# Patient Record
Sex: FEMALE | Race: WHITE | Employment: UNEMPLOYED | URBAN - METROPOLITAN AREA
[De-identification: names, ages, dates, MRNs, and addresses within clinical notes are randomized per-mention and may not be internally consistent; named-entity substitution may affect disease eponyms.]

---

## 2020-07-01 ENCOUNTER — HOSPITAL ENCOUNTER (EMERGENCY)
Age: 30
Discharge: HOME | End: 2020-07-01
Attending: EMERGENCY MEDICINE
Payer: COMMERCIAL

## 2020-07-01 VITALS
HEART RATE: 93 BPM | OXYGEN SATURATION: 100 % | DIASTOLIC BLOOD PRESSURE: 54 MMHG | HEIGHT: 64 IN | BODY MASS INDEX: 21.34 KG/M2 | TEMPERATURE: 97.7 F | SYSTOLIC BLOOD PRESSURE: 96 MMHG | WEIGHT: 125 LBS | RESPIRATION RATE: 12 BRPM

## 2020-07-01 LAB
BACTERIA: ABNORMAL /HPF
BILIRUBIN URINE: ABNORMAL
BLOOD, URINE: ABNORMAL
CLARITY: ABNORMAL
COLOR: ABNORMAL
EKG ATRIAL RATE: 105 BPM
EKG DIAGNOSIS: NORMAL
EKG P AXIS: 68 DEGREES
EKG P-R INTERVAL: 140 MS
EKG Q-T INTERVAL: 346 MS
EKG QRS DURATION: 86 MS
EKG QTC CALCULATION (BAZETT): 457 MS
EKG R AXIS: 47 DEGREES
EKG T AXIS: 25 DEGREES
EKG VENTRICULAR RATE: 105 BPM
EPITHELIAL CELLS, UA: ABNORMAL /HPF (ref 0–5)
GLUCOSE URINE: NEGATIVE MG/DL
HCG(URINE) PREGNANCY TEST: NEGATIVE
KETONES, URINE: ABNORMAL MG/DL
LEUKOCYTE ESTERASE, URINE: ABNORMAL
MICROSCOPIC EXAMINATION: YES
MUCUS: ABNORMAL /LPF
NITRITE, URINE: POSITIVE
PH UA: 5.5 (ref 5–8)
PROTEIN UA: 100 MG/DL
RBC UA: ABNORMAL /HPF (ref 0–4)
SPECIFIC GRAVITY UA: >=1.03 (ref 1–1.03)
URINE REFLEX TO CULTURE: YES
URINE TYPE: ABNORMAL
UROBILINOGEN, URINE: 4 E.U./DL
WBC UA: ABNORMAL /HPF (ref 0–5)

## 2020-07-01 PROCEDURE — 87086 URINE CULTURE/COLONY COUNT: CPT

## 2020-07-01 PROCEDURE — 87077 CULTURE AEROBIC IDENTIFY: CPT

## 2020-07-01 PROCEDURE — 93010 ELECTROCARDIOGRAM REPORT: CPT | Performed by: INTERNAL MEDICINE

## 2020-07-01 PROCEDURE — 81001 URINALYSIS AUTO W/SCOPE: CPT

## 2020-07-01 PROCEDURE — 93005 ELECTROCARDIOGRAM TRACING: CPT | Performed by: EMERGENCY MEDICINE

## 2020-07-01 PROCEDURE — 87186 SC STD MICRODIL/AGAR DIL: CPT

## 2020-07-01 PROCEDURE — 6370000000 HC RX 637 (ALT 250 FOR IP): Performed by: EMERGENCY MEDICINE

## 2020-07-01 PROCEDURE — 99284 EMERGENCY DEPT VISIT MOD MDM: CPT

## 2020-07-01 PROCEDURE — 84703 CHORIONIC GONADOTROPIN ASSAY: CPT

## 2020-07-01 RX ORDER — ONDANSETRON 4 MG/1
4 TABLET, ORALLY DISINTEGRATING ORAL EVERY 8 HOURS PRN
Qty: 10 TABLET | Refills: 0 | Status: SHIPPED | OUTPATIENT
Start: 2020-07-01 | End: 2021-03-17

## 2020-07-01 RX ORDER — CEFUROXIME AXETIL 250 MG/1
500 TABLET ORAL ONCE
Status: COMPLETED | OUTPATIENT
Start: 2020-07-01 | End: 2020-07-01

## 2020-07-01 RX ORDER — CLONIDINE HYDROCHLORIDE 0.1 MG/1
0.1 TABLET ORAL 3 TIMES DAILY
Qty: 15 TABLET | Refills: 0 | Status: SHIPPED | OUTPATIENT
Start: 2020-07-01 | End: 2021-03-17

## 2020-07-01 RX ORDER — NALOXONE HYDROCHLORIDE 4 MG/.1ML
1 SPRAY NASAL PRN
Qty: 1 EACH | Refills: 0 | Status: SHIPPED | OUTPATIENT
Start: 2020-07-01

## 2020-07-01 RX ORDER — CEFUROXIME AXETIL 250 MG/1
250 TABLET ORAL 2 TIMES DAILY
Qty: 14 TABLET | Refills: 0 | Status: SHIPPED | OUTPATIENT
Start: 2020-07-01 | End: 2020-07-08

## 2020-07-01 RX ADMIN — CEFUROXIME AXETIL 500 MG: 250 TABLET ORAL at 16:39

## 2020-07-01 ASSESSMENT — ENCOUNTER SYMPTOMS
VOMITING: 0
ABDOMINAL PAIN: 0
FACIAL SWELLING: 0
STRIDOR: 0
TROUBLE SWALLOWING: 0
NAUSEA: 0
SHORTNESS OF BREATH: 0
WHEEZING: 0
COLOR CHANGE: 0
VOICE CHANGE: 0

## 2020-07-01 NOTE — ED PROVIDER NOTES
[address] ED  eMERGENCY dEPARTMENT eNCOUnter      Pt Name: Gamal Oseguera  MRN: [id number]  [place] 1990  Date of evaluation: 7/1/2020  Provider: Rosie Rincon MD    CHIEF COMPLAINT       Chief Complaint   Patient presents with    Drug Overdose     Found unresponsive with needles next to her. Admits to heroin. Gave Narcan 2 mg IN by EMS at . Pt sluggish to respond. HISTORY OF PRESENT ILLNESS   (Location/Symptom, Timing/Onset, Context/Setting, Quality, Duration, Modifying Factors, Severity)  Note limiting factors. Gamal Oseguera is a 26 y.o. female with hx of drug abuse who presents via EMS after being found unresponsive by her father. The father reports that he let her stay at his house last night and this morning he found her unresponsive with needles next to her. Patient was given 2 mg of intranasal Narcan with provement in mental status and respiratory effort. She does report that she has been using IV heroin. She denies any SI or HI and reports it was recreational in nature and that the overdose was unintentional.  She denies any fever, nausea, vomiting, diarrhea, or medical complaints at this time. She questions were sudden onset, cough, and have improved with Narcan. HPI    Nursing Notes were reviewed. REVIEW OFSYSTEMS    (2-9 systems for level 4, 10 or more for level 5)     Review of Systems   Constitutional: Negative for appetite change, fever and unexpected weight change. HENT: Negative for facial swelling, trouble swallowing and voice change. Eyes: Negative for visual disturbance. Respiratory: Negative for shortness of breath, wheezing and stridor. Cardiovascular: Negative for chest pain and palpitations. Gastrointestinal: Negative for abdominal pain, nausea and vomiting. Genitourinary: Negative for vaginal bleeding. Musculoskeletal: Negative for neck pain and neck stiffness. Skin: Negative for color change and wound. Phone [phone]   MICROSCOPIC URINALYSIS - Abnormal; Notable for the following components:    Mucus, UA 4+ (*)     WBC, UA  (*)     RBC, UA 5-10 (*)     Bacteria, UA 1+ (*)     All other components within normal limits    Narrative:     Performed at:  [facility] [address],  [place], [facility]   Phone [phone]   CULTURE, URINE   PREGNANCY, URINE    Narrative:     Performed at:  [facility]) [facility] [address],  [place], [facility]   Phone [phone]       All otherlabs were within normal range or not returned as of this dictation. EMERGENCY DEPARTMENT COURSE and DIFFERENTIAL DIAGNOSIS/MDM:   Vitals:    Vitals:    07/01/20 1330 07/01/20 1433   BP: 113/76 (!) 110/59   Pulse: 105 96   Resp: 17 14   Temp: 97.7 °F (36.5 °C)    TempSrc: Oral    SpO2: 95% 100%   Weight: 125 lb (56.7 kg)    Height: 5' 4\" (1.626 m)          MDM   Patient is afebrile and nontoxic nonappearing. Presentation is consistent with an opioid overdose. She does have a GCS of 14 we have considered giving her more Narcan however no further Narcan is necessary during the patient stay in the emergency department and she maintains normal respiratory effort, pulse ox, and her mental status slowly but surely improves as she is observed for over 2 hours in the emergency department. The patient's father is at bedside and states he feels comfortable taking her home. She is prescribed Narcan to go as well as p.o. clonidine and Zofran for symptomatic relief. The patient is referred to local drug rehabilitation resources and strict ER return precautions discussed with the patient and her father. The patient expresses understanding and agreement with this plan. After the patient was marked for discharge and discharge instructions are being reviewed with them the father requests the patient be tested for urinary tract infection.   The patient denied any medical complaints initially upon arriving emergency room but now she states that she has had dysuria for the past 2 days. She denies any vaginal bleeding or vaginal discharge. She denies any concerns for STDs. She reports she does not want to be tested for urinary tract infection now that her father has mentioned that she does have dysuria. Analysis is obtained and does show evidence of acute cystitis with hematuria. Patient is given 1 dose of p.o. Ceftin emergency room and discharged home with 7 additional days. I estimate there is low risk for pyelonephritis, nephrolithiasis, or sepsis, thus I consider the discharge disposition reasonable. Also, there is no evidence for peritonitis, sepsis or toxicity. We have discussed the diagnosis and risks, and we agree with discharging home to follow-up with their primary doctor. We also discussed returning to the Emergency Department immediately if new or worsening symptoms occur. We have discussed the symptoms which are most concerning (e.g., changing or worsening pain, fever, nausea/vomiting, not tolerating PO intake) that necessitate immediate return. Procedures    FINAL IMPRESSION      1. Accidental overdose of heroin, initial encounter ([facility] [address]    2.  Acute cystitis with hematuria          DISPOSITION/PLAN   DISPOSITION Decision To Discharge 07/01/2020 03:31:41 PM      PATIENT REFERRED TO:  See list of drug rehab programs    Today      [address]  [phone]    In 2 days      [facility]  [address] [facility]  [phone]    If symptoms worsen      DISCHARGE MEDICATIONS:  New Prescriptions    CEFUROXIME (CEFTIN) 250 MG TABLET    Take 1 tablet by mouth 2 times daily for 7 days    CLONIDINE (CATAPRES) 0.1 MG TABLET    Take 1 tablet by mouth 3 times daily for 5 days    NALOXONE 4 MG/0.1ML LIQD NASAL SPRAY    1 spray by Nasal route as needed (For accidental Heroin / Opioid overdose) ONDANSETRON (ZOFRAN ODT) 4 MG DISINTEGRATING TABLET    Take 1 tablet by mouth every 8 hours as needed for Nausea          (Please note that portions of this note were completed with a voice recognition program.  Efforts were made to edit the dictations but occasionally words aremis-transcribed. )    Kannan Azevedo MD (electronically signed)  Attending Emergency Physician     Kannan Azevedo MD  07/01/20 2531

## 2020-07-03 LAB
ORGANISM: ABNORMAL
URINE CULTURE, ROUTINE: ABNORMAL
URINE CULTURE, ROUTINE: ABNORMAL

## 2020-09-17 ENCOUNTER — HOSPITAL ENCOUNTER (EMERGENCY)
Age: 30
Discharge: HOME | End: 2020-09-17
Payer: COMMERCIAL

## 2020-09-17 VITALS
TEMPERATURE: 97.4 F | HEART RATE: 81 BPM | OXYGEN SATURATION: 100 % | WEIGHT: 144 LBS | RESPIRATION RATE: 18 BRPM | DIASTOLIC BLOOD PRESSURE: 75 MMHG | SYSTOLIC BLOOD PRESSURE: 117 MMHG | BODY MASS INDEX: 24.59 KG/M2 | HEIGHT: 64 IN

## 2020-09-17 LAB — HCG(URINE) PREGNANCY TEST: NEGATIVE

## 2020-09-17 PROCEDURE — 99281 EMR DPT VST MAYX REQ PHY/QHP: CPT

## 2020-09-17 PROCEDURE — 84703 CHORIONIC GONADOTROPIN ASSAY: CPT

## 2020-09-17 RX ORDER — TRAZODONE HYDROCHLORIDE 50 MG/1
50 TABLET ORAL PRN
COMMUNITY
End: 2021-03-17

## 2020-09-17 RX ORDER — HYDROXYZINE 50 MG/1
50 TABLET, FILM COATED ORAL 3 TIMES DAILY PRN
COMMUNITY
End: 2021-03-17

## 2020-09-17 ASSESSMENT — ENCOUNTER SYMPTOMS
COLOR CHANGE: 0
RHINORRHEA: 0
SORE THROAT: 0
ABDOMINAL PAIN: 0
SHORTNESS OF BREATH: 0

## 2020-09-17 NOTE — ED PROVIDER NOTES
Evaluated by [id number] [facility] Provider          [facility] ED  EMERGENCY DEPARTMENT ENCOUNTER        Pt Name: Nadia Gonzalez  MRN: [id number]  [place] 1990  Dateof evaluation: 9/17/2020  Provider: NIKKIE Rosa - CNP  PCP: Sylvie Angelo MD  ED Attending: No att. providers found    [address] [facility]       Chief Complaint   Patient presents with    Pregnancy Test     pt states she is 7 days late and thinks she is pregnant, needs pregnancy test       HISTORY OF PRESENTILLNESS   (Location/Symptom, Timing/Onset, Context/Setting, Quality, Duration, Modifying Factors, Severity)  Note limiting factors. Nadia Gonzalez is a 31 y.o. female for pregnancy test.  Onset was today. Context includes patient states that she is 7 days late on her period and would like to have a pregnancy test.  Patient denies any abdominal pain nausea or vomiting. She is had no vaginal discharge or bleeding. Alleviating factors include nothing. Aggravating factors include nothing. Pain is 0/10. Nothing has been used for pain today. Nursing Notes were all reviewed and agreed with or any disagreements were addressed  in the HPI. REVIEW OF SYSTEMS    (2-9 systems for level 4, 10 or more for level 5)     Review of Systems   Constitutional: Negative for fever. Pregnancy test   HENT: Negative for congestion, rhinorrhea and sore throat. Respiratory: Negative for shortness of breath. Cardiovascular: Negative for chest pain. Gastrointestinal: Negative for abdominal pain. Genitourinary: Negative for decreased urine volume, difficulty urinating, vaginal bleeding, vaginal discharge and vaginal pain. Musculoskeletal: Negative for arthralgias and myalgias. Skin: Negative for color change and rash. Neurological: Negative for dizziness and light-headedness. Psychiatric/Behavioral: Negative for agitation. All other systems reviewed and are negative.       Positives and Pertinent negatives meetings of clubs or organizations: None     Relationship status: None    Intimate partner violence     Fear of current or ex partner: None     Emotionally abused: None     Physically abused: None     Forced sexual activity: None   Other Topics Concern    None   Social History Narrative    None       SCREENINGS             PHYSICAL EXAM  (up to 7 for level 4, 8 or more for level 5)     ED Triage Vitals [09/17/20 1450]   BP Temp Temp Source Pulse Resp SpO2 Height Weight   117/75 97.4 °F (36.3 °C) Oral 81 18 100 % 5' 4\" (1.626 m) 144 lb (65.3 kg)       Physical Exam  Constitutional:       Appearance: She is well-developed. HENT:      Head: Normocephalic and atraumatic. Neck:      Musculoskeletal: Normal range of motion. Cardiovascular:      Rate and Rhythm: Normal rate. Pulmonary:      Effort: Pulmonary effort is normal. No respiratory distress. Abdominal:      General: There is no distension. Palpations: Abdomen is soft. Tenderness: There is no abdominal tenderness. Musculoskeletal: Normal range of motion. Skin:     General: Skin is warm and dry. Neurological:      Mental Status: She is alert and oriented to person, place, and time. DIAGNOSTIC RESULTS   LABS:    Labs Reviewed   PREGNANCY, URINE    Narrative:     Performed at:  [facility] - [facility] [address],  [place], [facility]   Phone [phone]       All other labs werewithin normal range or not returned as of this dictation. EKG: All EKG's are interpreted by the Emergency Department Physician who either signs or Co-signs this chart in the absence of a cardiologist.  Please see their note for interpretation of EKG. RADIOLOGY:           Interpretation per the Radiologist below, if available at the time of this note:    No orders to display     No results found.       PROCEDURES   Unless otherwise noted below, none     Procedures     CRITICAL CARE TIME

## 2020-09-17 NOTE — ED NOTES
RN went to discharge patient and patient was not in room or restroom, RN attempted to locate patient twice with no success.       Justen Lawrence RN  09/17/20 1140

## 2020-10-03 ENCOUNTER — HOSPITAL ENCOUNTER (EMERGENCY)
Age: 30
Discharge: LEFT BEFORE DISCHARGE | End: 2020-10-03
Payer: COMMERCIAL

## 2020-10-03 VITALS — TEMPERATURE: 97.3 F | HEART RATE: 67 BPM | OXYGEN SATURATION: 99 %

## 2020-10-03 PROCEDURE — 99284 EMERGENCY DEPT VISIT MOD MDM: CPT

## 2020-10-03 NOTE — ED NOTES
To lobby to get pt to take her to room for treatment, pt states she is leaving and does not want to be seen anymore.  Tx team made aware     Lima Vazquez RN  10/03/20 2370

## 2020-10-04 NOTE — ED PROVIDER NOTES
needed for ItchingHistorical Med      naloxone 4 MG/0.1ML LIQD nasal spray 1 spray by Nasal route as needed (For accidental Heroin / Opioid overdose), Disp-1 each, R-0Print      ondansetron (ZOFRAN ODT) 4 MG disintegrating tablet Take 1 tablet by mouth every 8 hours as needed for Nausea, Disp-10 tablet, R-0Print      cloNIDine (CATAPRES) 0.1 MG tablet Take 1 tablet by mouth 3 times daily for 5 days, Disp-15 tablet, R-0Print               ALLERGIES     Patient has no known allergies. FAMILYHISTORY     History reviewed. No pertinent family history. SOCIAL HISTORY       Social History     Tobacco Use    Smoking status: Current Every Day Smoker     Packs/day: 0.50    Smokeless tobacco: Never Used   Substance Use Topics    Alcohol use: Yes     Comment: SOCIAL    Drug use: Yes     Comment: heroin       SCREENINGS             PHYSICAL EXAM    (up to 7 for level 4, 8 or more for level 5)     ED Triage Vitals [10/03/20 1708]   BP Temp Temp Source Pulse Resp SpO2 Height Weight   -- 97.3 °F (36.3 °C) Tympanic 67 -- 99 % -- --       Physical Exam  Vitals signs reviewed. Constitutional:       Appearance: She is not diaphoretic. HENT:      Nose: No congestion or rhinorrhea. Eyes:      General: No scleral icterus. Conjunctiva/sclera: Conjunctivae normal.   Neck:      Musculoskeletal: Normal range of motion and neck supple. Pulmonary:      Effort: Pulmonary effort is normal. No respiratory distress. Breath sounds: No stridor. Abdominal:      General: There is no distension. Palpations: Abdomen is soft. Tenderness: There is no abdominal tenderness. There is no right CVA tenderness, left CVA tenderness, guarding or rebound. Musculoskeletal: Normal range of motion. Skin:     General: Skin is warm and dry. Neurological:      General: No focal deficit present. Mental Status: She is alert and oriented to person, place, and time. Sensory: No sensory deficit.       Motor: No weakness. Psychiatric:         Mood and Affect: Mood normal.         Behavior: Behavior normal.         DIAGNOSTIC RESULTS   LABS:    Labs Reviewed   C.TRACHOMATIS N.GONORRHOEAE DNA   WET PREP, GENITAL   PREGNANCY, URINE       All other labs were within normal range or not returned as of this dictation. EKG: All EKG's are interpreted by the Emergency Department Physician in the absence of a cardiologist.  Please see their note for interpretation of EKG. RADIOLOGY:   Non-plain film images such as CT, Ultrasound and MRI are read by the radiologist. Plain radiographic images are visualized and preliminarily interpreted by the ED Provider with the below findings:        Interpretation per the Radiologist below, if available at the time of this note:    No orders to display     No results found. PROCEDURES   Unless otherwise noted below, none     Procedures    CRITICAL CARE TIME   N/A    CONSULTS:  None      EMERGENCY DEPARTMENT COURSE and DIFFERENTIAL DIAGNOSIS/MDM:   Vitals:    Vitals:    10/03/20 1708   Pulse: 67   Temp: 97.3 °F (36.3 °C)   TempSrc: Tympanic   SpO2: 99%       Patient was given the following medications:  Medications - No data to display        79-year-old female presents emergency room with concern for potential exposure to STD. She does not have any symptoms. Patient was agreeable to self swabs, pregnancy test.  Plan was to discharge after these tests. I did discuss with patient that I recommended follow-up with STD clinic or primary care physician for full STD testing including HIV and syphilis. I was informed by nurse that patient left before giving any samples, patient eloped without completion of care. FINAL IMPRESSION      1. Possible exposure to STD          DISPOSITION/PLAN   DISPOSITION Eloped - Left Before Treatment Complete 10/03/2020 10:43:18 PM      PATIENT REFERREDTO:  No follow-up provider specified.     DISCHARGE MEDICATIONS:  Discharge Medication List as of 10/3/2020  6:52 PM          DISCONTINUED MEDICATIONS:  Discharge Medication List as of 10/3/2020  6:52 PM                 (Please note that portions of this note were completed with a voice recognition program.  Efforts were made to edit the dictations but occasionally words are mis-transcribed.)    DEON Menjivar (electronically signed)         DEON Menjivar  10/03/20 5068

## 2020-10-05 ENCOUNTER — HOSPITAL ENCOUNTER (EMERGENCY)
Age: 30
Discharge: HOME | End: 2020-10-05
Payer: COMMERCIAL

## 2020-10-05 VITALS
DIASTOLIC BLOOD PRESSURE: 75 MMHG | HEIGHT: 64 IN | TEMPERATURE: 98.2 F | OXYGEN SATURATION: 98 % | SYSTOLIC BLOOD PRESSURE: 109 MMHG | RESPIRATION RATE: 16 BRPM | WEIGHT: 138 LBS | HEART RATE: 98 BPM | BODY MASS INDEX: 23.56 KG/M2

## 2020-10-05 LAB
BACTERIA WET PREP: NORMAL
BILIRUBIN URINE: NEGATIVE
BLOOD, URINE: NEGATIVE
CLARITY: CLEAR
CLUE CELLS: NORMAL
COLOR: YELLOW
EPITHELIAL CELLS WET PREP: NORMAL
GLUCOSE URINE: NEGATIVE MG/DL
HCG(URINE) PREGNANCY TEST: NEGATIVE
KETONES, URINE: NEGATIVE MG/DL
LEUKOCYTE ESTERASE, URINE: NEGATIVE
MICROSCOPIC EXAMINATION: NORMAL
NITRITE, URINE: NEGATIVE
PH UA: 6 (ref 5–8)
PROTEIN UA: NEGATIVE MG/DL
RBC WET PREP: NORMAL
SOURCE WET PREP: NORMAL
SPECIFIC GRAVITY UA: 1.02 (ref 1–1.03)
TRICHOMONAS PREP: NORMAL
URINE REFLEX TO CULTURE: NORMAL
URINE TYPE: NORMAL
UROBILINOGEN, URINE: 0.2 E.U./DL
WBC WET PREP: NORMAL
YEAST WET PREP: NORMAL

## 2020-10-05 PROCEDURE — 6370000000 HC RX 637 (ALT 250 FOR IP): Performed by: PHYSICIAN ASSISTANT

## 2020-10-05 PROCEDURE — 87491 CHLMYD TRACH DNA AMP PROBE: CPT

## 2020-10-05 PROCEDURE — 84703 CHORIONIC GONADOTROPIN ASSAY: CPT

## 2020-10-05 PROCEDURE — 96372 THER/PROPH/DIAG INJ SC/IM: CPT

## 2020-10-05 PROCEDURE — 81003 URINALYSIS AUTO W/O SCOPE: CPT

## 2020-10-05 PROCEDURE — 6360000002 HC RX W HCPCS: Performed by: PHYSICIAN ASSISTANT

## 2020-10-05 PROCEDURE — 99283 EMERGENCY DEPT VISIT LOW MDM: CPT

## 2020-10-05 PROCEDURE — 87591 N.GONORRHOEAE DNA AMP PROB: CPT

## 2020-10-05 PROCEDURE — 87210 SMEAR WET MOUNT SALINE/INK: CPT

## 2020-10-05 RX ORDER — FLUCONAZOLE 100 MG/1
100 TABLET ORAL ONCE
Status: COMPLETED | OUTPATIENT
Start: 2020-10-05 | End: 2020-10-05

## 2020-10-05 RX ORDER — CEFTRIAXONE SODIUM 250 MG/1
250 INJECTION, POWDER, FOR SOLUTION INTRAMUSCULAR; INTRAVENOUS ONCE
Status: COMPLETED | OUTPATIENT
Start: 2020-10-05 | End: 2020-10-05

## 2020-10-05 RX ORDER — METRONIDAZOLE 250 MG/1
2000 TABLET ORAL ONCE
Status: COMPLETED | OUTPATIENT
Start: 2020-10-05 | End: 2020-10-05

## 2020-10-05 RX ORDER — AZITHROMYCIN 250 MG/1
1000 TABLET, FILM COATED ORAL ONCE
Status: COMPLETED | OUTPATIENT
Start: 2020-10-05 | End: 2020-10-05

## 2020-10-05 RX ORDER — FLUCONAZOLE 150 MG/1
150 TABLET ORAL ONCE
Qty: 1 TABLET | Refills: 0 | Status: SHIPPED | OUTPATIENT
Start: 2020-10-05 | End: 2020-10-05

## 2020-10-05 RX ADMIN — CEFTRIAXONE SODIUM 250 MG: 250 INJECTION, POWDER, FOR SOLUTION INTRAMUSCULAR; INTRAVENOUS at 14:35

## 2020-10-05 RX ADMIN — AZITHROMYCIN 1000 MG: 250 TABLET, FILM COATED ORAL at 14:35

## 2020-10-05 RX ADMIN — FLUCONAZOLE 100 MG: 100 TABLET ORAL at 14:35

## 2020-10-05 RX ADMIN — METRONIDAZOLE 2000 MG: 250 TABLET ORAL at 14:35

## 2020-10-05 ASSESSMENT — ENCOUNTER SYMPTOMS
CHEST TIGHTNESS: 0
CONSTIPATION: 0
SINUS PRESSURE: 0
EYE DISCHARGE: 0
SHORTNESS OF BREATH: 0
RHINORRHEA: 0
NAUSEA: 0
ABDOMINAL PAIN: 0
VOMITING: 0
SORE THROAT: 0
COUGH: 0
SINUS PAIN: 0
DIARRHEA: 0
EYE REDNESS: 0

## 2020-10-05 NOTE — ED TRIAGE NOTES
Chief Complaint   Patient presents with    Exposure to STD     pt states she has been having white discharge, denies dysuria, wants to be checked for STD

## 2020-10-05 NOTE — ED PROVIDER NOTES
Evaluated by Advanced Practice Provider          [facility]  EMERGENCY DEPARTMENT ENCOUNTER      This patient was not seen and evaluated by the attending physician No att. providers found. I have independently evaluated this patient. Pt Name: Shoaib Scott  MRN: [id number]  [place] 1990  Dateof evaluation: 10/5/2020  Provider: Selam Sanchez PA-C  PCP: Daly Gomez MD  CHIEF COMPLAINT       Chief Complaint   Patient presents with    Exposure to STD     pt states she has been having white discharge, denies dysuria, wants to be checked for STD       HISTORY OF PRESENTILLNESS   (Location/Symptom, Timing/Onset, Context/Setting, Quality, Duration, Modifying Factors, Severity)  Note limiting factors. Shoaib Scott is a 31 y.o. female for evaluation via private vehicle patient thinks that she was exposed to chlamydia said that her partner tested positive for this she says she has had some white vaginal discharge. Denies any dyspareunia or dysuria any abnormal vaginal bleeding. Patient advised that she is not pregnant today no treatment prior to arrival.Nursing Notes were all reviewed and agreed with or any disagreements were addressed  in the HPI. REVIEW OF SYSTEMS    (2-9 systems for level 4, 10 or more for level 5)     Review of Systems   Constitutional: Negative for chills and fever. HENT: Negative. Negative for congestion, rhinorrhea, sinus pressure, sinus pain and sore throat. Eyes: Negative for discharge, redness and visual disturbance. Respiratory: Negative for cough, chest tightness and shortness of breath. Cardiovascular: Negative for chest pain and palpitations. Gastrointestinal: Negative for abdominal pain, constipation, diarrhea, nausea and vomiting. Genitourinary: Positive for vaginal discharge. Negative for difficulty urinating, dysuria and frequency. Musculoskeletal: Negative. Skin: Negative. Neurological: Negative.   Negative for dizziness, weakness, numbness and headaches. Psychiatric/Behavioral: Negative. All other systems reviewed and are negative. Positives and Pertinent negatives as per HPI. Except as noted above in the ROS, all other systems were reviewed and negative. 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 COLI-URINE         SURGICAL HISTORY     History reviewed. No pertinent surgical history. CURRENT MEDICATIONS       Discharge Medication List as of 10/5/2020  3:28 PM      CONTINUE these medications which have NOT CHANGED    Details   traZODone (DESYREL) 50 MG tablet Take 50 mg by mouth as needed for SleepHistorical Med      hydrOXYzine (ATARAX) 50 MG tablet Take 50 mg by mouth 3 times daily as needed for ItchingHistorical Med      naloxone 4 MG/0.1ML LIQD nasal spray 1 spray by Nasal route as needed (For accidental Heroin / Opioid overdose), Disp-1 each, R-0Print      ondansetron (ZOFRAN ODT) 4 MG disintegrating tablet Take 1 tablet by mouth every 8 hours as needed for Nausea, Disp-10 tablet, R-0Print      cloNIDine (CATAPRES) 0.1 MG tablet Take 1 tablet by mouth 3 times daily for 5 days, Disp-15 tablet, R-0Print               ALLERGIES     Patient has no known allergies. FAMILY HISTORY     History reviewed. No pertinent family history.        SOCIAL HISTORY       Social History     Socioeconomic History    Marital status: Single     Spouse name: None    Number of children: None    Years of education: None    Highest education level: None   Occupational History    None   Social Needs    Financial resource strain: None    Food insecurity     Worry: None     Inability: None    Transportation needs     Medical: None     Non-medical: None   Tobacco Use    Smoking status: Current Every Day Smoker     Packs/day: 0.50    Smokeless tobacco: Never Used   Substance and Sexual Activity    Alcohol use: Not Currently     Comment: SOCIAL    Drug use: Not Currently     Comment: heroin    Sexual activity: None   Lifestyle    Physical activity     Days per week: None     Minutes per session: None    Stress: None   Relationships    Social connections     Talks on phone: None     Gets together: None     Attends [employer] service: None     Active member of club or organization: None     Attends meetings of clubs or organizations: None     Relationship status: None    Intimate partner violence     Fear of current or ex partner: None     Emotionally abused: None     Physically abused: None     Forced sexual activity: None   Other Topics Concern    None   Social History Narrative    None       SCREENINGS     NIH Score       [facility]     Heart Score         PHYSICAL EXAM  (up to 7 for level 4, 8 or more for level 5)     ED Triage Vitals [10/05/20 1401]   BP Temp Temp Source Pulse Resp SpO2 Height Weight   109/75 98.2 °F (36.8 °C) Oral 98 16 98 % 5' 4\" (1.626 m) 138 lb (62.6 kg)       Physical Exam  Vitals signs and nursing note reviewed. Constitutional:       Appearance: She is well-developed. She is not diaphoretic. HENT:      Head: Normocephalic. Nose: Nose normal.      Mouth/Throat:      Pharynx: No oropharyngeal exudate. Eyes:      General:         Right eye: No discharge. Left eye: No discharge. Conjunctiva/sclera: Conjunctivae normal.      Pupils: Pupils are equal, round, and reactive to light. Neck:      Musculoskeletal: Normal range of motion. Cardiovascular:      Rate and Rhythm: Normal rate and regular rhythm. Heart sounds: Normal heart sounds. No murmur. No friction rub. No gallop. Pulmonary:      Effort: Pulmonary effort is normal. No respiratory distress. Breath sounds: Normal breath sounds. No wheezing. Abdominal:      General: Bowel sounds are normal. There is no distension. Palpations: Abdomen is soft. Tenderness:  There is no abdominal tenderness. Musculoskeletal: Normal range of motion. Skin:     General: Skin is warm and dry. Capillary Refill: Capillary refill takes less than 2 seconds. Neurological:      General: No focal deficit present. Mental Status: She is alert. Psychiatric:         Mood and Affect: Mood normal.         Behavior: Behavior normal.         DIAGNOSTIC RESULTS   LABS:    Labs Reviewed   WET PREP, GENITAL    Narrative:     Performed at:  [facility] [address],  [place], [facility]   Phone [phone]   C.TRACHOMATIS N.GONORRHOEAE DNA, URINE   URINE RT REFLEX TO CULTURE    Narrative:     Performed at:  [facility] [address],  [place], [facility]   Phone [phone]   PREGNANCY, URINE    Narrative:     Performed at:  [facility] [address],  [place], [facility]   Phone [phone]       All other labs werewithin normal range or not returned as of this dictation. CONSULTS:  None      EMERGENCYDEPARTMENT COURSE and DIFFERENTIAL DIAGNOSIS/MDM:   Vitals:    Vitals:    10/05/20 1401   BP: 109/75   Pulse: 98   Resp: 16   Temp: 98.2 °F (36.8 °C)   TempSrc: Oral   SpO2: 98%   Weight: 138 lb (62.6 kg)   Height: 5' 4\" (1.626 m)       Patient was given the following medications:  Medications   cefTRIAXone (ROCEPHIN) injection 250 mg (250 mg Intramuscular Given 10/5/20 1435)   azithromycin (ZITHROMAX) tablet 1,000 mg (1,000 mg Oral Given 10/5/20 1435)   metroNIDAZOLE (FLAGYL) tablet 2,000 mg (2,000 mg Oral Given 10/5/20 1435)   fluconazole (DIFLUCAN) tablet 100 mg (100 mg Oral Given 10/5/20 1435)           Afebrile, stable, patient presents to the ED for evaluation. Seen on my own, per my scope of practice, with attending ED provider available for consultation who agrees with assessment and plan.   Nontoxic patient no abdominal or pelvic tenderness, normal vital signs in no acute distress OBSTRUCTION, CHOLECYSTITIS, DIVERTICULITIS, INCARCERATED HERNIA, PANCREATITIS, PELVIC INFLAMMATORY DISEASE, PERFORATED BOWEL or ULCER, PREGNANCY, or TUBO-OVARIAN ABSCESS, thus I consider the discharge disposition reasonable. Also, there is no evidence or peritonitis, sepsis, or toxicity. Annika Gathers and I have discussed the diagnosis and risks, and we agree with discharging home to follow-up with their primary doctor. We also discussed returning to the Emergency Department immediately if new or worsening symptoms occur. We have discussed the symptoms which are most concerning (e.g., bloody stool, fever, changing or worsening pain, vomiting) that necessitate immediate return. Final Impression    1. Exposure to STD        Blood pressure 109/75, pulse 98, temperature 98.2 °F (36.8 °C), temperature source Oral, resp. rate 16, height 5' 4\" (1.626 m), weight 138 lb (62.6 kg), last menstrual period 09/19/2020, SpO2 98 %.   10/05/2020 03:16:27 PM      PATIENT REFERRED TO:  Justo Sparks MD  [address] [facility]  [phone]            DISCHARGE MEDICATIONS:  Discharge Medication List as of 10/5/2020  3:28 PM      START taking these medications    Details   fluconazole (DIFLUCAN) 150 MG tablet Take 1 tablet by mouth once for 1 dose, Disp-1 tablet,R-0Print             DISCONTINUED MEDICATIONS:  Discharge Medication List as of 10/5/2020  3:28 PM                 (Please note that portions of this note were completed with a voice recognition program.  Efforts were made to edit the dictations but occasionally words are mis-transcribed.)    Phillip Cobb PA-C (electronically signed)         Phillip Cobb PA-C  10/05/20 1013

## 2020-10-05 NOTE — ED NOTES
Called pt. Unable to locate pt in lobby. Per registration pt stated she was going to cafeteria.      Irina Malloy RN  10/05/20 4238

## 2020-10-06 LAB
C. TRACHOMATIS DNA ,URINE: NEGATIVE
N. GONORRHOEAE DNA, URINE: NEGATIVE

## 2021-03-17 ENCOUNTER — HOSPITAL ENCOUNTER (EMERGENCY)
Age: 31
Discharge: HOME | End: 2021-03-17
Attending: EMERGENCY MEDICINE
Payer: COMMERCIAL

## 2021-03-17 VITALS
HEIGHT: 64 IN | WEIGHT: 156 LBS | RESPIRATION RATE: 18 BRPM | HEART RATE: 99 BPM | SYSTOLIC BLOOD PRESSURE: 112 MMHG | TEMPERATURE: 97.8 F | BODY MASS INDEX: 26.63 KG/M2 | OXYGEN SATURATION: 99 % | DIASTOLIC BLOOD PRESSURE: 63 MMHG

## 2021-03-17 DIAGNOSIS — T40.1X1A ACCIDENTAL OVERDOSE OF HEROIN, INITIAL ENCOUNTER (HCC): Primary | ICD-10-CM

## 2021-03-17 DIAGNOSIS — Z34.92 SECOND TRIMESTER PREGNANCY: ICD-10-CM

## 2021-03-17 LAB
A/G RATIO: 1.1 (ref 1.1–2.2)
ACETAMINOPHEN LEVEL: <5 UG/ML (ref 10–30)
ALBUMIN SERPL-MCNC: 4.1 G/DL (ref 3.4–5)
ALP BLD-CCNC: 68 U/L (ref 40–129)
ALT SERPL-CCNC: 44 U/L (ref 10–40)
AMPHETAMINE SCREEN, URINE: POSITIVE
ANION GAP SERPL CALCULATED.3IONS-SCNC: 11 MMOL/L (ref 3–16)
AST SERPL-CCNC: 37 U/L (ref 15–37)
BARBITURATE SCREEN URINE: ABNORMAL
BASOPHILS ABSOLUTE: 0 K/UL (ref 0–0.2)
BASOPHILS RELATIVE PERCENT: 0.5 %
BENZODIAZEPINE SCREEN, URINE: ABNORMAL
BILIRUB SERPL-MCNC: <0.2 MG/DL (ref 0–1)
BILIRUBIN URINE: NEGATIVE
BLOOD, URINE: NEGATIVE
BUN BLDV-MCNC: 8 MG/DL (ref 7–20)
CALCIUM SERPL-MCNC: 9.3 MG/DL (ref 8.3–10.6)
CANNABINOID SCREEN URINE: ABNORMAL
CHLORIDE BLD-SCNC: 99 MMOL/L (ref 99–110)
CLARITY: CLEAR
CO2: 27 MMOL/L (ref 21–32)
COCAINE METABOLITE SCREEN URINE: ABNORMAL
COLOR: YELLOW
CREAT SERPL-MCNC: <0.5 MG/DL (ref 0.6–1.1)
EOSINOPHILS ABSOLUTE: 0.1 K/UL (ref 0–0.6)
EOSINOPHILS RELATIVE PERCENT: 1.3 %
ETHANOL: NORMAL MG/DL (ref 0–0.08)
GFR AFRICAN AMERICAN: >60
GFR NON-AFRICAN AMERICAN: >60
GLOBULIN: 3.6 G/DL
GLUCOSE BLD-MCNC: 90 MG/DL (ref 70–99)
GLUCOSE URINE: NEGATIVE MG/DL
GONADOTROPIN, CHORIONIC (HCG) QUANT: NORMAL MIU/ML
HCT VFR BLD CALC: 38.9 % (ref 36–48)
HEMOGLOBIN: 13.5 G/DL (ref 12–16)
KETONES, URINE: NEGATIVE MG/DL
LEUKOCYTE ESTERASE, URINE: NEGATIVE
LYMPHOCYTES ABSOLUTE: 4.4 K/UL (ref 1–5.1)
LYMPHOCYTES RELATIVE PERCENT: 43.6 %
Lab: ABNORMAL
MCH RBC QN AUTO: 30.8 PG (ref 26–34)
MCHC RBC AUTO-ENTMCNC: 34.6 G/DL (ref 31–36)
MCV RBC AUTO: 89 FL (ref 80–100)
METHADONE SCREEN, URINE: ABNORMAL
MICROSCOPIC EXAMINATION: NORMAL
MONOCYTES ABSOLUTE: 0.5 K/UL (ref 0–1.3)
MONOCYTES RELATIVE PERCENT: 4.9 %
NEUTROPHILS ABSOLUTE: 5 K/UL (ref 1.7–7.7)
NEUTROPHILS RELATIVE PERCENT: 49.7 %
NITRITE, URINE: NEGATIVE
OPIATE SCREEN URINE: ABNORMAL
OXYCODONE URINE: ABNORMAL
PDW BLD-RTO: 14.5 % (ref 12.4–15.4)
PH UA: 5.5
PH UA: 5.5 (ref 5–8)
PHENCYCLIDINE SCREEN URINE: ABNORMAL
PLATELET # BLD: 281 K/UL (ref 135–450)
PMV BLD AUTO: 8.4 FL (ref 5–10.5)
POTASSIUM REFLEX MAGNESIUM: 3.6 MMOL/L (ref 3.5–5.1)
PROPOXYPHENE SCREEN: ABNORMAL
PROTEIN UA: NEGATIVE MG/DL
RBC # BLD: 4.37 M/UL (ref 4–5.2)
SALICYLATE, SERUM: <0.3 MG/DL (ref 15–30)
SODIUM BLD-SCNC: 137 MMOL/L (ref 136–145)
SPECIFIC GRAVITY UA: 1.02 (ref 1–1.03)
TOTAL PROTEIN: 7.7 G/DL (ref 6.4–8.2)
URINE REFLEX TO CULTURE: NORMAL
URINE TYPE: NORMAL
UROBILINOGEN, URINE: 0.2 E.U./DL
WBC # BLD: 10.2 K/UL (ref 4–11)

## 2021-03-17 PROCEDURE — 80307 DRUG TEST PRSMV CHEM ANLYZR: CPT

## 2021-03-17 PROCEDURE — 99284 EMERGENCY DEPT VISIT MOD MDM: CPT

## 2021-03-17 PROCEDURE — 82077 ASSAY SPEC XCP UR&BREATH IA: CPT

## 2021-03-17 PROCEDURE — 80143 DRUG ASSAY ACETAMINOPHEN: CPT

## 2021-03-17 PROCEDURE — 80179 DRUG ASSAY SALICYLATE: CPT

## 2021-03-17 PROCEDURE — 85025 COMPLETE CBC W/AUTO DIFF WBC: CPT

## 2021-03-17 PROCEDURE — 80053 COMPREHEN METABOLIC PANEL: CPT

## 2021-03-17 PROCEDURE — 84702 CHORIONIC GONADOTROPIN TEST: CPT

## 2021-03-17 PROCEDURE — 81003 URINALYSIS AUTO W/O SCOPE: CPT

## 2021-03-17 PROCEDURE — 2580000003 HC RX 258: Performed by: PHYSICIAN ASSISTANT

## 2021-03-17 RX ORDER — ONDANSETRON 2 MG/ML
4 INJECTION INTRAMUSCULAR; INTRAVENOUS EVERY 6 HOURS PRN
Status: DISCONTINUED | OUTPATIENT
Start: 2021-03-17 | End: 2021-03-17 | Stop reason: HOSPADM

## 2021-03-17 RX ORDER — 0.9 % SODIUM CHLORIDE 0.9 %
1000 INTRAVENOUS SOLUTION INTRAVENOUS ONCE
Status: COMPLETED | OUTPATIENT
Start: 2021-03-17 | End: 2021-03-17

## 2021-03-17 RX ORDER — NALOXONE HYDROCHLORIDE 4 MG/.1ML
1 SPRAY NASAL PRN
Qty: 1 EACH | Refills: 0 | Status: SHIPPED | OUTPATIENT
Start: 2021-03-17

## 2021-03-17 RX ADMIN — SODIUM CHLORIDE 1000 ML: 9 INJECTION, SOLUTION INTRAVENOUS at 12:12

## 2021-03-17 ASSESSMENT — ENCOUNTER SYMPTOMS
RESPIRATORY NEGATIVE: 1
NAUSEA: 1

## 2021-03-17 NOTE — ED PROVIDER NOTES
[address] [facility]  EMERGENCY DEPARTMENT ENCOUNTER        Pt Name: Melany Yoon  MRN: [id number]  [place] 1990  Date of evaluation: 3/17/2021  Provider: Karen Salvador PA-C  PCP: No primary care provider on file. I have seen and evaluated this patient with my supervising physician Chad Townsend MD.    [facility]       Chief Complaint   Patient presents with    Drug Overdose     heroin overdose, given 4mg narcan prior to EMS arrival, c/o nausea given zofran by EMS, patient is 18 weeks pregnant       HISTORY OF PRESENT ILLNESS   (Location, Timing/Onset, Context/Setting, Quality, Duration, Modifying Factors, Severity, Associated Signs and Symptoms)  Note limiting factors. Melany Yoon is a 31 y.o. female  A2 with a past medical history of tobacco use brought in today by squad for evaluation of a heroin overdose. Per EMS she did receive 4 mg of Narcan prior to arrival from her boyfriend who states that she was unresponsive at the time prior to receiving narcan. He also received Zofran for nausea. Patient is 18 weeks pregnant. States that she used heroin at about 11 AM.  She states that she has not used in about 6 months. Denies any alcohol use. Patient does smoke tobacco.  Onset occurred at about 11 AM.  Duration symptoms have been persistent since onset. Context includes heroin overdose. Patient reports that this is her fourth pregnancy. She has followed with [facility]. Denies any complications with pregnancy at this time. States she has had regular visits. She denies any chest pain or shortness of breath. Denies abdominal pain. Denies vaginal bleeding. She states she was nauseous however was given Zofran and reports improvement of symptoms. No aggravating complaints. No alleviating complaints. Otherwise denies any other complaints at this time. Nothing seems to make symptoms better or worse.     Nursing Notes were all reviewed and agreed with or any disagreements were addressed in the HPI. REVIEW OF SYSTEMS    (2-9 systems for level 4, 10 or more for level 5)     Review of Systems   Constitutional: Negative. Respiratory: Negative. Cardiovascular: Negative. Gastrointestinal: Positive for nausea. Genitourinary: Negative. Musculoskeletal: Negative. Skin: Negative. Neurological: Negative. Positives and Pertinent negatives as per HPI. Except as noted above in the ROS, all other systems were reviewed and negative. 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 COLI-URINE         SURGICAL HISTORY   No past surgical history on file. CURRENTMEDICATIONS       Previous Medications    NALOXONE 4 MG/0.1ML LIQD NASAL SPRAY    1 spray by Nasal route as needed (For accidental Heroin / Opioid overdose)         ALLERGIES     Patient has no known allergies. FAMILYHISTORY     No family history on file. SOCIAL HISTORY       Social History     Tobacco Use    Smoking status: Current Every Day Smoker     Packs/day: 0.50    Smokeless tobacco: Never Used   Substance Use Topics    Alcohol use: Not Currently     Comment: SOCIAL    Drug use: Not Currently     Types: IV     Comment: heroin       SCREENINGS             PHYSICAL EXAM    (up to 7 for level 4, 8 or more for level 5)     ED Triage Vitals [03/17/21 1122]   BP Temp Temp Source Pulse Resp SpO2 Height Weight   122/86 97.8 °F (36.6 °C) Oral 91 18 100 % 5' 4\" (1.626 m) 156 lb (70.8 kg)       Physical Exam  Vitals signs and nursing note reviewed. Constitutional:       General: She is awake. She is not in acute distress. Appearance: Normal appearance. She is well-developed. She is not ill-appearing, toxic-appearing or diaphoretic. HENT:      Head: Normocephalic and atraumatic. Nose: Nose normal.   Eyes:      General:         Right eye: No discharge.          Left eye: No discharge. Neck:      Musculoskeletal: Normal range of motion and neck supple. Cardiovascular:      Rate and Rhythm: Normal rate and regular rhythm. Pulses:           Radial pulses are 2+ on the right side and 2+ on the left side. Heart sounds: Normal heart sounds. No murmur. No gallop. Pulmonary:      Effort: Pulmonary effort is normal. No respiratory distress. Breath sounds: Normal breath sounds. No decreased breath sounds, wheezing, rhonchi or rales. Chest:      Chest wall: No tenderness. Abdominal:      General: Abdomen is flat. Bowel sounds are normal.      Palpations: Abdomen is soft. Tenderness: There is no abdominal tenderness. There is no rebound. Musculoskeletal: Normal range of motion. General: No deformity. Right lower leg: No edema. Left lower leg: No edema. Skin:     General: Skin is warm and dry. Neurological:      General: No focal deficit present. Mental Status: She is alert and oriented to person, place, and time. GCS: GCS eye subscore is 4. GCS verbal subscore is 5. GCS motor subscore is 6. Psychiatric:         Behavior: Behavior normal. Behavior is cooperative.          DIAGNOSTIC RESULTS   LABS:    Labs Reviewed   COMPREHENSIVE METABOLIC PANEL W/ REFLEX TO MG FOR LOW K - Abnormal; Notable for the following components:       Result Value    CREATININE <0.5 (*)     ALT 44 (*)     All other components within normal limits    Narrative:     Performed at:  [facility] [address],  [place], [facility]   Phone [phone]   [address] - Abnormal; Notable for the following components:    Amphetamine Screen, Urine POSITIVE (*)     All other components within normal limits    Narrative:     Performed at:  [facility]) - [facility] [address],  [place], [facility]   Phone [phone]   SALICYLATE LEVEL - Abnormal; Notable for the following components: DIAGNOSIS/MDM:   Vitals:    Vitals:    03/17/21 1237 03/17/21 1249 03/17/21 1338 03/17/21 1407   BP: (!) 106/59  (!) 112/56 (!) 102/58   Pulse: 80 88 96 92   Resp: 13 23 21 12   Temp:       TempSrc:       SpO2: 100% 100% 100% 100%   Weight:       Height:           Patient was given the following medications:  Medications   ondansetron (ZOFRAN) injection 4 mg (has no administration in time range)   0.9 % sodium chloride bolus (0 mLs Intravenous Stopped 3/17/21 1420)           Patient was brought in today by EMS for evaluation of heroin overdose. Patient is also 18 weeks pregnant. Denied vaginal bleeding or abdominal pain. She did receive 4 of Narcan prior to arrival to the ED from boyfriend who found her unresponsive. On exam at this time she is alert oriented afebrile breathing on room air satting at 100%. Nontoxic in appearance and in no acute respiratory distress. Old labs and records reviewed at this time. Patient seen by myself as well as my attending, Dr. Patricia Alberto. CBC shows no acute leukocytosis. Hemoglobin of 13.5. No acute electrolyte abnormalities. Kidney function unremarkable. Liver enzymes unremarkable. Ethanol is negative. Salicylate levels negative. Acetaminophen levels negative. [place] of [address]. Bedside ultrasound revealed intrauterine pregnancy with a heart rate of 154 bpm.  Patient received fluids and Zofran here. Urine drug screen positive for amphetamines. Patient given fluids and Zofran here. Patient p.o. challenged and tolerated p.o. Plan at this time will be to consult patient's OB at [facility]. I spoke to Dr. John Goldberg with OB who patient has seen initially for positive pregnancy test.  He recommended follow-up in the next several days. Per OB records patient has had several missed appointments for this pregnancy. Plan at this time will be to give patient outpatient referrals for substance use.   Will give patient substance use referrals in AVS.    Patient DISINTEGRATING TABLET    Take 1 tablet by mouth every 8 hours as needed for Nausea    TRAZODONE (DESYREL) 50 MG TABLET    Take 50 mg by mouth as needed for Sleep              (Please note that portions of this note were completed with a voice recognition program.  Efforts were made to edit the dictations but occasionally words are mis-transcribed.)    Linwood Chacko PA-C (electronically signed)            Linwood Chacko PA-C  03/17/21 7370

## 2021-03-17 NOTE — ED PROVIDER NOTES
Emergency Department Attending Provider Note  Location: [place] [address] ED  3/17/2021     Patient Identification  Kamron Meyers is a 31 y.o. female      Kamron Meyers was evaluated in the Emergency Department for unintentional overdose opioid. Status post Narcan given at home by domestic partner. G5, P2 she reports that she was unresponsive and was given Narcan prior to EMS arrival.  Currently feels nauseous. She denies any other substance use she denies SI or HI. Reports she is 18 weeks pregnant followed by [address] She denies any complications thus far. .     Physical exam revealed:  Vital signs reviewed  Gen: Alert and oriented, no acute distress  Card: RRR, no murmurs, equal radial pulses  Resp: CBSBL, no wheezes rales or rhonchi  Abd: Soft nontender, nondistended abdomen, no guarding or rebound, no CVA tenderness  Ext: No deformities noted  Neuro: Grossly normal moving extremities equally        Bedside ultrasound  Transabdominal ultrasound was performed by myself using the phased-array probe  -Fetal pulse noted 154  -Fetal movement appreciated, subjectively appropriate amount of amniotic fluid    Patient seen and evaluated. Relevant records reviewed. 26-year-old female presents after unintentional opioid overdose. On exam here she is nauseous but otherwise well-appearing no acute distress reassuring vital signs borderline tachycardic in the setting of vomiting. She has adequate fetal movement fetal pulse noted at a rate of 150s. Awaiting labs, plan to discuss with OB/GYN for observation planning. 11:57 AM      Although initial history and physical exam information was obtained by JAKOB/NPP/MD/ (who also dictated a record of this visit), I independently examined and evaluated this patient and made all diagnostic, treatment, and disposition decisions.     This chart was generated in part by using Dragon Dictation system and may contain errors related to that system including errors in grammar, punctuation, and spelling, as well as words and phrases that may be inappropriate. If there are any questions or concerns please feel free to contact the dictating provider for clarification.      Kristin Atkins MD  [address] MD  03/17/21 7049

## 2021-03-17 NOTE — ED NOTES
Discharge instructions reviewed, patient verbalizes understanding. Denies questions/concerns at this time. Patient ambulatory out of ED in stable condition with all belongings.        Savannah Lee RN  03/17/21 4897

## 2021-03-17 NOTE — DISCHARGE INSTR - COC
Continuity of Care Form    Patient Name: Michele Goldberg   :  1990  MRN:  [id number]    Admit date:  3/17/2021  Discharge date:  ***    Code Status Order: No Order   Advance Directives:     Admitting Physician:  No admitting provider for patient encounter. PCP: No primary care provider on file. Discharging Nurse: [facility] Unit/Room#:   Discharging Unit Phone Number: ***    Emergency Contact:   Extended Emergency Contact Information  Primary Emergency Contact: [address], [address] Phone: [phone]  Work Phone: [phone]  Mobile Phone: [phone]  Relation: Parent    Past Surgical History:  No past surgical history on file. Immunization History: There is no immunization history on file for this patient. Active Problems: There is no problem list on file for this patient.       Isolation/Infection:   Isolation          No Isolation        Patient Infection Status     Infection Onset Added Last Indicated Last Indicated By Review Planned Expiration Resolved Resolved By    MDRO (multi-drug resistant organism) 20 Culture, Urine              Nurse Assessment:  Last Vital Signs: BP (!) 112/56   Pulse 96   Temp 97.8 °F (36.6 °C) (Oral)   Resp 21   Ht 5' 4\" (1.626 m)   Wt 156 lb (70.8 kg)   LMP 2020   SpO2 100%   BMI 26.78 kg/m²     Last documented pain score (0-10 scale):    Last Weight:   Wt Readings from Last 1 Encounters:   21 156 lb (70.8 kg)     Mental Status:  {IP PT MENTAL STATUS:}    IV Access:  { MILLICENT IV ACCESS:010530141}    Nursing Mobility/ADLs:  Walking   {P DME SZKF:374382663}  Transfer  {P DME HGFN:164275063}  Bathing  {P DME VUXX:652951318}  Dressing  {P DME CKMY:183141403}  Toileting  {P DME CHVF:235838993}  Feeding  {P DME XXOV:449000376}  Med Admin  {P DME RIOH:065609924}  Med Delivery   { MILLICENT MED Delivery:005841667}    Wound Care Documentation and Therapy:        Elimination:  Continence: · Bowel: {YES / WL:07824}  · Bladder: {YES / UE:11645}  Urinary Catheter: {Urinary Catheter:804548570}   Colostomy/Ileostomy/Ileal Conduit: {YES / RH:08037}       Date of Last BM: ***  No intake or output data in the 24 hours ending 21 1437  No intake/output data recorded.     Safety Concerns:     [address] [facility] Safety Concerns:038748860}    Impairments/Disabilities:      [address] [place] [address] Impairments/Disabilities:422256336}    Nutrition Therapy:  Current Nutrition Therapy:   [address] [facility] Diet List:741366884}    Routes of Feeding: {CHP DME Other Feedings:642183559}  Liquids: {Slp liquid thickness:66237}  Daily Fluid Restriction: {CHP DME Yes amt example:840758641}  Last Modified Barium Swallow with Video (Video Swallowing Test): {Done Not Done UMDA:579530936}    Treatments at the Time of Hospital Discharge:   Respiratory Treatments: ***  Oxygen Therapy:  {Therapy; copd oxygen:75321}  Ventilator:    { CC Vent YTXJ:388698129}    Rehab Therapies: {THERAPEUTIC INTERVENTION:1337916516}  Weight Bearing Status/Restrictions: [address] [place] [address] Weight Bearin}  Other Medical Equipment (for information only, NOT a DME order):  {EQUIPMENT:609160283}  Other Treatments: ***    Patient's personal belongings (please select all that are sent with patient):  {P DME Belongings:539693841}    RN SIGNATURE:  {Esignature:471898637}    CASE MANAGEMENT/SOCIAL WORK SECTION    Inpatient Status Date: ***    Readmission Risk Assessment Score:  Readmission Risk              Risk of Unplanned Readmission:        0           Discharging to Facility/ Agency   · Name:   · Address:  · Phone:  · Fax:    Dialysis Facility (if applicable)   · Name:  · Address:  · Dialysis Schedule:  · Phone:  · Fax:    / signature: {Esignature:710566457}    PHYSICIAN SECTION    Prognosis: {Prognosis:0412521551}    Condition at Discharge: [address] Patient Condition:677173887}    Rehab Potential (if transferring to Rehab): {Prognosis:3467292759}    Recommended Labs or Other Treatments After Discharge: ***    Physician Certification: I certify the above information and transfer of Shital Sanchez  is necessary for the continuing treatment of the diagnosis listed and that she requires {Admit to Appropriate Level of Care:39544} for {GREATER/LESS:801465811} 30 days.      Update Admission H&P: {CHP DME Changes in YKJTY:041981888}    PHYSICIAN SIGNATURE:  {Esignature:586704712}

## 2021-07-28 ENCOUNTER — HOSPITAL ENCOUNTER (EMERGENCY)
Age: 31
Discharge: HOME | End: 2021-07-28
Attending: STUDENT IN AN ORGANIZED HEALTH CARE EDUCATION/TRAINING PROGRAM
Payer: COMMERCIAL

## 2021-07-28 VITALS
OXYGEN SATURATION: 99 % | RESPIRATION RATE: 18 BRPM | HEART RATE: 91 BPM | BODY MASS INDEX: 32.44 KG/M2 | DIASTOLIC BLOOD PRESSURE: 67 MMHG | WEIGHT: 190 LBS | SYSTOLIC BLOOD PRESSURE: 131 MMHG | TEMPERATURE: 97 F | HEIGHT: 64 IN

## 2021-07-28 DIAGNOSIS — Z53.21 PATIENT LEFT BEFORE EVALUATION BY PHYSICIAN: Primary | ICD-10-CM

## 2021-07-28 ASSESSMENT — PAIN DESCRIPTION - ORIENTATION: ORIENTATION: LEFT;RIGHT

## 2021-07-28 ASSESSMENT — PAIN SCALES - GENERAL: PAINLEVEL_OUTOF10: 9

## 2021-07-28 ASSESSMENT — PAIN DESCRIPTION - PAIN TYPE: TYPE: ACUTE PAIN

## 2021-07-28 ASSESSMENT — PAIN DESCRIPTION - LOCATION: LOCATION: HAND

## 2021-07-28 NOTE — ED PROVIDER NOTES
I had a signed myself to patient's case after she was assigned a room in ER, patient could not be found in room she was look for on multiple occasions however patient is presumed left emergency department prior to physician evaluation. Patient did not give me opportunity to perform history and physical exam, I will .       Costa Lundy,   07/28/21 3170

## 2023-09-14 ENCOUNTER — HOSPITAL ENCOUNTER (EMERGENCY)
Age: 33
Discharge: HOME | End: 2023-09-14
Payer: COMMERCIAL

## 2023-09-14 VITALS
DIASTOLIC BLOOD PRESSURE: 76 MMHG | RESPIRATION RATE: 19 BRPM | OXYGEN SATURATION: 98 % | TEMPERATURE: 99 F | SYSTOLIC BLOOD PRESSURE: 117 MMHG | HEART RATE: 111 BPM

## 2023-09-14 DIAGNOSIS — L02.91 ABSCESS: Primary | ICD-10-CM

## 2023-09-14 PROCEDURE — 10061 I&D ABSCESS COMP/MULTIPLE: CPT

## 2023-09-14 PROCEDURE — 6370000000 HC RX 637 (ALT 250 FOR IP): Performed by: NURSE PRACTITIONER

## 2023-09-14 PROCEDURE — 99283 EMERGENCY DEPT VISIT LOW MDM: CPT

## 2023-09-14 RX ORDER — SULFAMETHOXAZOLE AND TRIMETHOPRIM 800; 160 MG/1; MG/1
1 TABLET ORAL ONCE
Status: COMPLETED | OUTPATIENT
Start: 2023-09-14 | End: 2023-09-14

## 2023-09-14 RX ORDER — CEPHALEXIN 500 MG/1
500 CAPSULE ORAL 4 TIMES DAILY
Qty: 40 CAPSULE | Refills: 0 | Status: SHIPPED | OUTPATIENT
Start: 2023-09-14 | End: 2023-09-24

## 2023-09-14 RX ORDER — CEPHALEXIN 250 MG/1
500 CAPSULE ORAL ONCE
Status: COMPLETED | OUTPATIENT
Start: 2023-09-14 | End: 2023-09-14

## 2023-09-14 RX ORDER — SULFAMETHOXAZOLE AND TRIMETHOPRIM 800; 160 MG/1; MG/1
1 TABLET ORAL 2 TIMES DAILY
Qty: 20 TABLET | Refills: 0 | Status: SHIPPED | OUTPATIENT
Start: 2023-09-14 | End: 2023-09-24

## 2023-09-14 RX ADMIN — CEPHALEXIN 500 MG: 250 CAPSULE ORAL at 18:58

## 2023-09-14 RX ADMIN — SULFAMETHOXAZOLE AND TRIMETHOPRIM 1 TABLET: 800; 160 TABLET ORAL at 18:58

## 2023-09-14 ASSESSMENT — ENCOUNTER SYMPTOMS
ROS SKIN COMMENTS: RIGHT ARM WOUND
SHORTNESS OF BREATH: 0
VOMITING: 0
NAUSEA: 0
CHEST TIGHTNESS: 0
DIARRHEA: 0
ABDOMINAL PAIN: 0

## 2023-09-14 ASSESSMENT — PAIN - FUNCTIONAL ASSESSMENT: PAIN_FUNCTIONAL_ASSESSMENT: NONE - DENIES PAIN

## 2023-09-14 NOTE — ED NOTES
Discharge and education instructions reviewed. Patient verbalized understanding, teach-back successful. Patient denied questions at this time. No acute distress noted. Patient instructed to follow-up as noted - return to emergency department if symptoms worsen. Patient verbalized understanding. Discharged per EDMD with discharged instructions.        Nelly Topete RN  09/14/23 3260

## 2023-09-15 NOTE — ED PROVIDER NOTES
[facility]        Pt Name: Sharron Garsia  MRN: [id number]  [address] [facility] [place] 1990  Date of evaluation: 9/14/2023  Provider: NIKKIE Florian CNP  PCP: No primary care provider on file. Note Started: 8:47 PM EDT 9/14/23      JAKOB. I have evaluated this patient. CHIEF COMPLAINT       Chief Complaint   Patient presents with    Abscess     States she was at [address] [place] [address] 1 week ago to get an abscess lanced in her right arm and the packing fell out, is concerned for infection. Pt also states she is going to rehab today for heroin, last use was yesterday        HISTORY OF PRESENT ILLNESS: 1 or more Elements     History from : Patient    Limitations to history : None    Sharron Garsia is a 28 y.o. female who presents the emergency department with concern of abscess to the right antecubital fossa. The patient is a IV drug abuser, she was seen at another hospital and the wound was incised and drained about a week ago, she finished a course of clindamycin today. She is concerned with some residual puffiness and redness. Concern for infection. The patient is going to inpatient rehab tonight and wanted to get this taken care of before going. She denies injection since last visit. Denies any headache, fever, lightheadedness, dizziness, visual disturbances. No chest pain or pressure. No neck or back pain. No shortness of breath, cough, or congestion. No abdominal pain, nausea, vomiting, diarrhea, constipation, or dysuria. No rash. Nursing Notes were all reviewed and agreed with or any disagreements were addressed in the HPI. REVIEW OF SYSTEMS :      Review of Systems   Constitutional:  Negative for activity change, chills and fever. Respiratory:  Negative for chest tightness and shortness of breath. Cardiovascular:  Negative for chest pain.    Gastrointestinal:  Negative for abdominal pain, diarrhea, nausea and 1 capsule by mouth 4 times daily for 10 days, Disp-40 capsule, R-0Print      sulfamethoxazole-trimethoprim (BACTRIM DS) 800-160 MG per tablet Take 1 tablet by mouth 2 times daily for 10 days, Disp-20 tablet, R-0Print             DISCONTINUED MEDICATIONS:  Discharge Medication List as of 9/14/2023  6:56 PM                 (Please note that portions of this note were completed with a voice recognition program.  Efforts were made to edit the dictations but occasionally words are mis-transcribed.)    NIKKIE Bustillos CNP (electronically signed)           NIKKIE Bustillos CNP  09/14/23 2051

## 2024-01-26 ENCOUNTER — TELEPHONE (OUTPATIENT)
Age: 34
End: 2024-01-26

## 2024-01-26 ENCOUNTER — OFFICE VISIT (OUTPATIENT)
Age: 34
End: 2024-01-26

## 2024-01-26 VITALS
SYSTOLIC BLOOD PRESSURE: 121 MMHG | HEART RATE: 81 BPM | BODY MASS INDEX: 28.17 KG/M2 | HEIGHT: 64 IN | WEIGHT: 165 LBS | TEMPERATURE: 97.9 F | OXYGEN SATURATION: 99 % | DIASTOLIC BLOOD PRESSURE: 74 MMHG

## 2024-01-26 DIAGNOSIS — N89.8 VAGINAL DISCHARGE: Primary | ICD-10-CM

## 2024-01-26 RX ORDER — NALTREXONE 380 MG
KIT INTRAMUSCULAR
COMMUNITY
Start: 2024-01-04

## 2024-01-26 RX ORDER — FLUOXETINE 10 MG/1
TABLET ORAL
COMMUNITY
Start: 2020-09-03

## 2024-01-26 RX ORDER — HYDROXYZINE HYDROCHLORIDE 25 MG/1
TABLET, FILM COATED ORAL
COMMUNITY
Start: 2022-09-01

## 2024-01-26 RX ORDER — METRONIDAZOLE 500 MG/1
500 TABLET ORAL 3 TIMES DAILY
Qty: 15 TABLET | Refills: 0 | Status: SHIPPED | OUTPATIENT
Start: 2024-01-26 | End: 2024-01-31

## 2024-01-26 ASSESSMENT — ENCOUNTER SYMPTOMS
DIARRHEA: 0
ABDOMINAL PAIN: 0
SORE THROAT: 0
BACK PAIN: 0
NAUSEA: 0
VOMITING: 0

## 2024-01-26 NOTE — PROGRESS NOTES
Sahara Patrick (:  1990) is a 33 y.o. female,New patient, here for evaluation of the following chief complaint(s):  Vaginal Discharge (Vaginal discharge and odor for 2/3 days )      ASSESSMENT/PLAN:  1. Vaginal discharge    - metroNIDAZOLE (FLAGYL) 500 MG tablet; Take 1 tablet by mouth 3 times daily for 5 days  Dispense: 15 tablet; Refill: 0     -pt was advised to return to  or see her GYN if not better  No follow-ups on file.    SUBJECTIVE/OBJECTIVE:  Pt stated that she has a h/o BV,feels the same,wants to be treated      History provided by:  Patient  Vaginal Discharge  The patient's primary symptoms include a genital odor (fishy) and vaginal discharge. The patient's pertinent negatives include no genital itching, genital lesions, genital rash, missed menses, pelvic pain or vaginal bleeding. This is a new problem. The current episode started in the past 7 days. The problem occurs constantly. The problem has been unchanged. The patient is experiencing no pain. She is not pregnant. Pertinent negatives include no abdominal pain, back pain, chills, diarrhea, discolored urine, dysuria, fever, flank pain, frequency, headaches, joint swelling, nausea, painful intercourse, rash, sore throat, urgency or vomiting. There has been no bleeding.       Vitals:    24 1622   BP: 121/74   Site: Right Upper Arm   Position: Sitting   Cuff Size: Large Adult   Pulse: 81   Temp: 97.9 °F (36.6 °C)   TempSrc: Oral   SpO2: 99%   Weight: 74.8 kg (165 lb)   Height: 1.613 m (5' 3.5\")       Review of Systems   Constitutional:  Negative for activity change, appetite change, chills and fever.   HENT:  Negative for congestion and sore throat.    Gastrointestinal:  Negative for abdominal pain, diarrhea, nausea and vomiting.   Genitourinary:  Positive for vaginal discharge. Negative for dysuria, flank pain, frequency, missed menses, pelvic pain and urgency.   Musculoskeletal:  Negative for back pain.   Skin:  Negative for rash.

## 2024-01-26 NOTE — TELEPHONE ENCOUNTER
Patient would like medication sent to [facility] in [place]. The other pharmacy is closed.

## 2024-02-25 ENCOUNTER — OFFICE VISIT (OUTPATIENT)
Age: 34
End: 2024-02-25

## 2024-02-25 VITALS
TEMPERATURE: 98.3 F | RESPIRATION RATE: 16 BRPM | WEIGHT: 157 LBS | SYSTOLIC BLOOD PRESSURE: 116 MMHG | DIASTOLIC BLOOD PRESSURE: 80 MMHG | HEIGHT: 63 IN | OXYGEN SATURATION: 98 % | BODY MASS INDEX: 27.82 KG/M2 | HEART RATE: 103 BPM

## 2024-02-25 DIAGNOSIS — R30.0 DYSURIA: Primary | ICD-10-CM

## 2024-02-25 DIAGNOSIS — J01.40 ACUTE NON-RECURRENT PANSINUSITIS: ICD-10-CM

## 2024-02-25 DIAGNOSIS — A64 STI (SEXUALLY TRANSMITTED INFECTION): ICD-10-CM

## 2024-02-25 LAB
BILIRUBIN, POC: ABNORMAL
BLOOD URINE, POC: ABNORMAL
CLARITY, POC: ABNORMAL
COLOR, POC: ABNORMAL
GLUCOSE URINE, POC: ABNORMAL
KETONES, POC: ABNORMAL
LEUKOCYTE EST, POC: ABNORMAL
NITRITE, POC: ABNORMAL
PH, POC: 6.5
PROTEIN, POC: ABNORMAL
SPECIFIC GRAVITY, POC: 1.02
UROBILINOGEN, POC: 0.2

## 2024-02-25 RX ORDER — NITROFURANTOIN 25; 75 MG/1; MG/1
100 CAPSULE ORAL 2 TIMES DAILY
Qty: 20 CAPSULE | Refills: 0 | Status: SHIPPED | OUTPATIENT
Start: 2024-02-25 | End: 2024-03-06

## 2024-02-25 RX ORDER — PHENAZOPYRIDINE HYDROCHLORIDE 200 MG/1
200 TABLET, FILM COATED ORAL 3 TIMES DAILY PRN
Qty: 6 TABLET | Refills: 0 | Status: SHIPPED | OUTPATIENT
Start: 2024-02-25 | End: 2024-02-27

## 2024-02-25 RX ORDER — CEFTRIAXONE 500 MG/1
500 INJECTION, POWDER, FOR SOLUTION INTRAMUSCULAR; INTRAVENOUS ONCE
Status: COMPLETED | OUTPATIENT
Start: 2024-02-25 | End: 2024-02-25

## 2024-02-25 RX ORDER — BROMPHENIRAMINE MALEATE, PSEUDOEPHEDRINE HYDROCHLORIDE, AND DEXTROMETHORPHAN HYDROBROMIDE 2; 30; 10 MG/5ML; MG/5ML; MG/5ML
5 SYRUP ORAL 4 TIMES DAILY PRN
Qty: 118 ML | Refills: 0 | Status: SHIPPED | OUTPATIENT
Start: 2024-02-25

## 2024-02-25 RX ADMIN — CEFTRIAXONE 500 MG: 500 INJECTION, POWDER, FOR SOLUTION INTRAMUSCULAR; INTRAVENOUS at 16:54

## 2024-02-27 LAB
BACTERIA UR CULT: ABNORMAL
ORGANISM: ABNORMAL

## 2024-02-28 LAB
C TRACH DNA UR QL NAA+PROBE: NEGATIVE
N GONORRHOEA DNA UR QL NAA+PROBE: NEGATIVE

## 2024-09-20 ENCOUNTER — OFFICE VISIT (OUTPATIENT)
Age: 34
End: 2024-09-20

## 2024-09-20 DIAGNOSIS — Z75.8 DOES NOT HAVE PRIMARY CARE PROVIDER: ICD-10-CM

## 2024-09-20 DIAGNOSIS — N89.8 VAGINAL ODOR: Primary | ICD-10-CM

## 2024-09-20 DIAGNOSIS — Z11.3 ENCOUNTER FOR SCREENING EXAMINATION FOR SEXUALLY TRANSMITTED INFECTION: ICD-10-CM

## 2024-09-20 RX ORDER — CEFTRIAXONE 500 MG/1
500 INJECTION, POWDER, FOR SOLUTION INTRAMUSCULAR; INTRAVENOUS ONCE
Status: COMPLETED | OUTPATIENT
Start: 2024-09-20 | End: 2024-09-20

## 2024-09-20 RX ADMIN — CEFTRIAXONE 500 MG: 500 INJECTION, POWDER, FOR SOLUTION INTRAMUSCULAR; INTRAVENOUS at 17:25

## 2024-09-21 LAB
CANDIDA DNA VAG QL NAA+PROBE: ABNORMAL
G VAGINALIS DNA SPEC QL NAA+PROBE: ABNORMAL
T VAGINALIS DNA VAG QL NAA+PROBE: ABNORMAL

## 2024-09-23 LAB
C TRACH DNA UR QL NAA+PROBE: NEGATIVE
N GONORRHOEA DNA UR QL NAA+PROBE: NEGATIVE

## 2024-11-01 ENCOUNTER — OFFICE VISIT (OUTPATIENT)
Age: 34
End: 2024-11-01

## 2024-11-01 VITALS
OXYGEN SATURATION: 98 % | BODY MASS INDEX: 27.63 KG/M2 | TEMPERATURE: 97.8 F | RESPIRATION RATE: 16 BRPM | SYSTOLIC BLOOD PRESSURE: 112 MMHG | DIASTOLIC BLOOD PRESSURE: 66 MMHG | WEIGHT: 156 LBS | HEART RATE: 76 BPM

## 2024-11-01 DIAGNOSIS — L03.114 CELLULITIS OF HAND, LEFT: Primary | ICD-10-CM

## 2024-11-01 RX ORDER — CEPHALEXIN 500 MG/1
500 CAPSULE ORAL 4 TIMES DAILY
Qty: 40 CAPSULE | Refills: 0 | Status: SHIPPED | OUTPATIENT
Start: 2024-11-01 | End: 2024-11-11

## 2024-11-01 RX ORDER — SULFAMETHOXAZOLE AND TRIMETHOPRIM 800; 160 MG/1; MG/1
1 TABLET ORAL 2 TIMES DAILY
Qty: 20 TABLET | Refills: 0 | Status: SHIPPED | OUTPATIENT
Start: 2024-11-01 | End: 2024-11-11

## 2024-11-01 ASSESSMENT — ENCOUNTER SYMPTOMS
SORE THROAT: 0
RHINORRHEA: 0
SHORTNESS OF BREATH: 0
COUGH: 0
VOMITING: 0
EYE PAIN: 0
DIARRHEA: 0

## 2024-11-01 NOTE — PROGRESS NOTES
Sahara Patrick (:  1990) is a 34 y.o. female,Established patient, here for evaluation of the following chief complaint(s):  Rash      ASSESSMENT/PLAN:  1. Cellulitis of hand, left    - cephALEXin (KEFLEX) 500 MG capsule; Take 1 capsule by mouth 4 times daily for 10 days  Dispense: 40 capsule; Refill: 0  - sulfamethoxazole-trimethoprim (BACTRIM DS;SEPTRA DS) 800-160 MG per tablet; Take 1 tablet by mouth 2 times daily for 10 days  Dispense: 20 tablet; Refill: 0     -apply warm compress.to the ER if worsening symptoms.  Return if symptoms worsen or fail to improve.    SUBJECTIVE/OBJECTIVE:    History provided by:  Patient  Rash  This is a new problem. The current episode started in the past 7 days. The problem has been gradually worsening since onset. The affected locations include the left hand and left arm. The rash is characterized by redness. She was exposed to nothing. Pertinent negatives include no congestion, cough, diarrhea, eye pain, facial edema, fatigue, fever, rhinorrhea, shortness of breath, sore throat or vomiting.       Vitals:    24 1716   BP: 112/66   Pulse: 76   Resp: 16   SpO2: 98%   Weight: 70.8 kg (156 lb)       Review of Systems   Constitutional:  Negative for fatigue and fever.   HENT:  Negative for congestion, rhinorrhea and sore throat.    Eyes:  Negative for pain.   Respiratory:  Negative for cough and shortness of breath.    Gastrointestinal:  Negative for diarrhea and vomiting.   Skin:  Positive for rash.       Physical Exam  Constitutional:       General: She is not in acute distress.  HENT:      Nose: No congestion.      Mouth/Throat:      Mouth: Mucous membranes are moist.      Pharynx: No posterior oropharyngeal erythema.   Eyes:      Conjunctiva/sclera: Conjunctivae normal.      Pupils: Pupils are equal, round, and reactive to light.   Cardiovascular:      Rate and Rhythm: Normal rate and regular rhythm.   Pulmonary:      Effort: Pulmonary effort is normal. No